# Patient Record
Sex: FEMALE | URBAN - METROPOLITAN AREA
[De-identification: names, ages, dates, MRNs, and addresses within clinical notes are randomized per-mention and may not be internally consistent; named-entity substitution may affect disease eponyms.]

---

## 2021-03-04 ENCOUNTER — APPOINTMENT (RX ONLY)
Dept: URBAN - METROPOLITAN AREA CLINIC 88 | Facility: CLINIC | Age: 31
Setting detail: DERMATOLOGY
End: 2021-03-04

## 2021-03-04 DIAGNOSIS — R21 RASH AND OTHER NONSPECIFIC SKIN ERUPTION: ICD-10-CM | Status: INADEQUATELY CONTROLLED

## 2021-03-04 PROCEDURE — 11104 PUNCH BX SKIN SINGLE LESION: CPT

## 2021-03-04 PROCEDURE — ? COUNSELING

## 2021-03-04 PROCEDURE — ? OTHER

## 2021-03-04 PROCEDURE — ? BIOPSY BY PUNCH METHOD

## 2021-03-04 ASSESSMENT — LOCATION SIMPLE DESCRIPTION DERM: LOCATION SIMPLE: RIGHT BUTTOCK

## 2021-03-04 ASSESSMENT — PAIN INTENSITY VAS: HOW INTENSE IS YOUR PAIN 0 BEING NO PAIN, 10 BEING THE MOST SEVERE PAIN POSSIBLE?: NO PAIN

## 2021-03-04 ASSESSMENT — LOCATION ZONE DERM: LOCATION ZONE: TRUNK

## 2021-03-04 ASSESSMENT — LOCATION DETAILED DESCRIPTION DERM: LOCATION DETAILED: RIGHT BUTTOCK

## 2021-03-04 ASSESSMENT — SEVERITY ASSESSMENT: SEVERITY: MODERATE

## 2021-03-04 NOTE — PROCEDURE: OTHER
Other (Free Text): Call with results between 3 pm and 5 pm, and schedule follow ups on thursdays.
Detail Level: Zone
Render Risk Assessment In Note?: no
Note Text (......Xxx Chief Complaint.): This diagnosis correlates with the

## 2021-03-04 NOTE — PROCEDURE: BIOPSY BY PUNCH METHOD
Detail Level: Detailed
Was A Bandage Applied: Yes
Punch Size In Mm: 4
Biopsy Type: H and E
Anesthesia Type: 1% lidocaine with epinephrine
Anesthesia Volume In Cc (Will Not Render If 0): 0.5
Additional Anesthesia Volume In Cc (Will Not Render If 0): 0
Hemostasis: None
Epidermal Sutures: 4-0 Nylon
Wound Care: Petrolatum
Dressing: bandage
Suture Removal: 14 days
Patient Will Remove Sutures At Home?: No
Lab: [id number]
Lab Facility: [id number]
Consent: Written consent was obtained and risks were reviewed including but not limited to scarring, infection, bleeding, scabbing, incomplete removal, nerve damage and allergy to anesthesia.
Post-Care Instructions: I reviewed with the patient in detail post-care instructions. Patient is to keep the biopsy site dry overnight, and then apply bacitracin twice daily until healed. Patient may apply hydrogen peroxide soaks to remove any crusting.
Home Suture Removal Text: Patient was provided a home suture removal kit and will remove their sutures at home.  If they have any questions or difficulties they will call the office.
Notification Instructions: Patient will be notified of biopsy results. However, patient instructed to call the office if not contacted within 2 weeks.
Billing Type: Third-Party Bill
Information: Selecting Yes will display possible errors in your note based on the variables you have selected. This validation is only offered as a suggestion for you. PLEASE NOTE THAT THE VALIDATION TEXT WILL BE REMOVED WHEN YOU FINALIZE YOUR NOTE. IF YOU WANT TO FAX A PRELIMINARY NOTE YOU WILL NEED TO TOGGLE THIS TO 'NO' IF YOU DO NOT WANT IT IN YOUR FAXED NOTE.

## 2021-03-11 ENCOUNTER — APPOINTMENT (RX ONLY)
Dept: URBAN - METROPOLITAN AREA CLINIC 88 | Facility: CLINIC | Age: 31
Setting detail: DERMATOLOGY
End: 2021-03-11

## 2021-03-11 DIAGNOSIS — Z48.02 ENCOUNTER FOR REMOVAL OF SUTURES: ICD-10-CM

## 2021-03-11 PROCEDURE — ? SUTURE REMOVAL (GLOBAL PERIOD)

## 2021-03-11 ASSESSMENT — LOCATION ZONE DERM: LOCATION ZONE: TRUNK

## 2021-03-11 ASSESSMENT — LOCATION SIMPLE DESCRIPTION DERM: LOCATION SIMPLE: RIGHT BUTTOCK

## 2021-03-11 ASSESSMENT — LOCATION DETAILED DESCRIPTION DERM: LOCATION DETAILED: RIGHT BUTTOCK

## 2021-03-11 NOTE — PROCEDURE: SUTURE REMOVAL (GLOBAL PERIOD)
Detail Level: Detailed
Add 52998 Cpt? (Important Note: In 2017 The Use Of 61682 Is Being Tracked By Cms To Determine Future Global Period Reimbursement For Global Periods): no